# Patient Record
Sex: FEMALE | HISPANIC OR LATINO | URBAN - METROPOLITAN AREA
[De-identification: names, ages, dates, MRNs, and addresses within clinical notes are randomized per-mention and may not be internally consistent; named-entity substitution may affect disease eponyms.]

---

## 2019-01-23 ENCOUNTER — OFFICE VISIT (OUTPATIENT)
Dept: GASTROENTEROLOGY | Facility: CLINIC | Age: 59
End: 2019-01-23

## 2019-01-23 VITALS
BODY MASS INDEX: 42.04 KG/M2 | HEIGHT: 68 IN | WEIGHT: 277.4 LBS | DIASTOLIC BLOOD PRESSURE: 88 MMHG | HEART RATE: 93 BPM | SYSTOLIC BLOOD PRESSURE: 130 MMHG

## 2019-01-23 DIAGNOSIS — E66.01 CLASS 3 SEVERE OBESITY DUE TO EXCESS CALORIES WITHOUT SERIOUS COMORBIDITY WITH BODY MASS INDEX (BMI) OF 40.0 TO 44.9 IN ADULT (HCC): ICD-10-CM

## 2019-01-23 DIAGNOSIS — K75.4 HEPATITIS, AUTOIMMUNE (HCC): Primary | ICD-10-CM

## 2019-01-23 PROCEDURE — S0260 H&P FOR SURGERY: HCPCS | Performed by: INTERNAL MEDICINE

## 2019-01-23 RX ORDER — LATANOPROST 50 UG/ML
1 SOLUTION/ DROPS OPHTHALMIC NIGHTLY
COMMUNITY

## 2019-01-23 RX ORDER — AZATHIOPRINE 50 MG/1
50 TABLET ORAL DAILY
COMMUNITY
End: 2019-05-22 | Stop reason: SDUPTHER

## 2019-01-23 RX ORDER — LOSARTAN POTASSIUM AND HYDROCHLOROTHIAZIDE 25; 100 MG/1; MG/1
1 TABLET ORAL DAILY
COMMUNITY

## 2019-01-23 RX ORDER — BUDESONIDE 9 MG/1
TABLET, FILM COATED, EXTENDED RELEASE ORAL
COMMUNITY
End: 2019-03-08

## 2019-01-24 PROBLEM — K75.4 HEPATITIS, AUTOIMMUNE (HCC): Status: ACTIVE | Noted: 2019-01-24

## 2019-01-24 PROBLEM — E66.01 CLASS 3 SEVERE OBESITY DUE TO EXCESS CALORIES WITHOUT SERIOUS COMORBIDITY WITH BODY MASS INDEX (BMI) OF 40.0 TO 44.9 IN ADULT (HCC): Status: ACTIVE | Noted: 2019-01-24

## 2019-01-24 PROBLEM — E66.813 CLASS 3 SEVERE OBESITY DUE TO EXCESS CALORIES WITHOUT SERIOUS COMORBIDITY WITH BODY MASS INDEX (BMI) OF 40.0 TO 44.9 IN ADULT: Status: ACTIVE | Noted: 2019-01-24

## 2019-01-24 NOTE — PROGRESS NOTES
GASTROENTEROLOGY OFFICE NOTE  Niru Woodward  [id number]  1960    CARE TEAM  Patient Care Team:  Sathya Summers MD as PCP - General (Internal Medicine)    No ref. provider found     Chief Complaint   Patient presents with   • Follow-up     idiopathic autoimmune hepatitis         HISTORY OF PRESENT ILLNESS:  Patient well known to me with long-standing idiopathic autoimmune hepatitis currently being managed with azathioprine 50 mg per day and budesonide 9 mg per day.  She is asymptomatic without fevers, chills, jaundice, fatigue, malaise, dysphagia, odynophagia, early satiety or unexplained weight loss    Her most recent serum liver enzymes are from 12/28/18 and her AST at that time was 24 units per liter and ALT was 20 units per liter.  Alpine phosphatase was only mildly elevated at 120 units per liter.    She is otherwise doing well    PAST MEDICAL HISTORY  Past Medical History:   Diagnosis Date   • Autoimmune hepatitis (CMS/HCC)    • Hypertension         PAST SURGICAL HISTORY  Past Surgical History:   Procedure Laterality Date   • CARPAL TUNNEL RELEASE     • CHOLECYSTECTOMY     • HYSTERECTOMY          MEDICATIONS:    Current Outpatient Medications:   •  azaTHIOprine (IMURAN) 50 MG tablet, Take 50 mg by mouth Daily., Disp: , Rfl:   •  Budesonide ER 9 MG tablet sustained-release 24 hour, Take  by mouth., Disp: , Rfl:   •  latanoprost (XALATAN) 0.005 % ophthalmic solution, 1 drop Every Night., Disp: , Rfl:   •  losartan-hydrochlorothiazide (HYZAAR) 100-25 MG per tablet, Take 1 tablet by mouth Daily., Disp: , Rfl:   •  TIMOLOL HEMIHYDRATE OP, Apply  to eye(s) as directed by provider., Disp: , Rfl:     ALLERGIES  No Known Allergies    FAMILY HISTORY:  Family History   Problem Relation Age of Onset   • Hypertension Mother    • Hypertension Father    • Hypertension Sister    • Rheum arthritis Child        SOCIAL HISTORY  Social History     Socioeconomic History   • Marital status:      Spouse name: Not on  "file   • Number of children: Not on file   • Years of education: Not on file   • Highest education level: Not on file   Tobacco Use   • Smoking status: Never Smoker   • Smokeless tobacco: Never Used   Substance and Sexual Activity   • Alcohol use: No     Frequency: Never   • Drug use: No   • Sexual activity: Defer     Socioeconomic History: She is .  Which children.  She works [employer] business.  She is a nonsmoker/nondrinker        REVIEW OF SYSTEMS  Review of Systems    PHYSICAL EXAM   /88 (BP Location: Right arm, Patient Position: Sitting, Cuff Size: Adult)   Pulse 93   Ht 172.7 cm (68\")   Wt 126 kg (277 lb 6.4 oz)   BMI 42.18 kg/m²   General: Alert and oriented x 3. In no apparent or acute distress.  and No stigmata of chronic liver disease  HEENT: Anicteric slcera. Normal oropharynx  Neck: Supple. Without lymphadenopathy  CV: Regular rate and rhythm, S1, S2  Lungs: Clear to ausculation. Without rales, robchi and wheezing  Abdomen:  Soft,non-distended without palpable masses or hepatosplenomeagaly, areas of rebound tenderness or guarding.   Extremeties: without clubbing, cyanosis or edema  Neurologic:  Alert and oriented x 3 without focal motor or sensory deficits  Rectal exam: deferred     No results found for this or any previous visit.     Results Review:  I reviewed the patient's new clinical results.      ASSESSMENT   1.-Idiopathic autoimmune hepatitis with essentially normal serum liver enzymes.  2.-Obesity.  Again, weight loss strategies were reviewed with patient she expressed an understanding of the need and importance do so.  She states she was doing very well up until the recent holidays and had been losing weight but gained that back    PLAN  1.-Continue budesonide 9 mg per day  2.-Continue azathioprine 50 mg per day  3.-Weight loss  4.-Return appointment in 6 months      I discussed the patients findings and my recommendations with patient    Mikey Hyatt, " MD  1/24/2019   6:51 AM    Much of this note is an electronic transcription of spoken language to printed text. Electronic transcription of spoken language may permit erroneous, nonsensical word phrases to be inadvertently transcribed.  Although I have reviewed the note for these errors, some may still be present.

## 2019-03-08 RX ORDER — BUDESONIDE 3 MG/1
CAPSULE, COATED PELLETS ORAL
Qty: 90 CAPSULE | Refills: 3 | Status: SHIPPED | OUTPATIENT
Start: 2019-03-08 | End: 2019-07-08 | Stop reason: SDUPTHER

## 2019-05-22 DIAGNOSIS — K75.4 HEPATITIS, AUTOIMMUNE (HCC): Primary | ICD-10-CM

## 2019-05-22 DIAGNOSIS — Z51.81 MEDICATION MONITORING ENCOUNTER: ICD-10-CM

## 2019-05-22 DIAGNOSIS — Z51.81 ENCOUNTER FOR MEDICATION MONITORING: ICD-10-CM

## 2019-05-22 RX ORDER — AZATHIOPRINE 50 MG/1
TABLET ORAL
Qty: 60 TABLET | Refills: 2 | Status: SHIPPED | OUTPATIENT
Start: 2019-05-22 | End: 2020-04-13

## 2019-07-09 RX ORDER — BUDESONIDE 3 MG/1
CAPSULE, COATED PELLETS ORAL
Qty: 90 CAPSULE | Refills: 2 | Status: SHIPPED | OUTPATIENT
Start: 2019-07-09 | End: 2019-11-10 | Stop reason: SDUPTHER

## 2019-08-15 ENCOUNTER — OFFICE VISIT (OUTPATIENT)
Dept: GASTROENTEROLOGY | Facility: CLINIC | Age: 59
End: 2019-08-15

## 2019-08-15 VITALS
WEIGHT: 264.4 LBS | SYSTOLIC BLOOD PRESSURE: 133 MMHG | DIASTOLIC BLOOD PRESSURE: 92 MMHG | HEART RATE: 92 BPM | HEIGHT: 68 IN | BODY MASS INDEX: 40.07 KG/M2

## 2019-08-15 DIAGNOSIS — K75.4 HEPATITIS, AUTOIMMUNE (HCC): Primary | ICD-10-CM

## 2019-08-15 PROCEDURE — 99211 OFF/OP EST MAY X REQ PHY/QHP: CPT | Performed by: INTERNAL MEDICINE

## 2019-08-15 NOTE — PROGRESS NOTES
GASTROENTEROLOGY OFFICE NOTE  Niru Woodward  [id number]     1960    CARE TEAM  Patient Care Team:  Sathya Summers MD as PCP - General (Internal Medicine)    No ref. provider found     Chief Complaint   Patient presents with   • Follow-up     Idiopathic autoimmune hepatitis        HISTORY OF PRESENT ILLNESS:  Patient presents for follow-up for prescription refill basically she is on Imuran 50 mg/day and budesonide 9 mg/day with normal serum liver enzymes currently.  Her most recent labs are reviewed.  Her AST is 25 units/L and her ALT 18 units/L with high normal 40 and 32 units/L respectively.  Alkaline phosphatase is only mildly elevated at 127 units/L (high normal 117 units/L) total bilirubin is 0.4.    She denies dysphagia, odynophagia, early satiety, unexplained weight loss, melanotic stools, constipation or diarrhea, dark urine, jaundice, acholic stools and states that she has been making efforts to lose weight and as a result has lost some weight through dietary modifications        PAST MEDICAL HISTORY  Past Medical History:   Diagnosis Date   • Autoimmune hepatitis (CMS/HCC)    • Hypertension         PAST SURGICAL HISTORY  Past Surgical History:   Procedure Laterality Date   • CARPAL TUNNEL RELEASE     • CHOLECYSTECTOMY     • HYSTERECTOMY          MEDICATIONS:    Current Outpatient Medications:   •  azaTHIOprine (IMURAN) 50 MG tablet, Take 2 tablets by mouth daily, Disp: 60 tablet, Rfl: 2  •  Budesonide (ENTOCORT EC) 3 MG 24 hr capsule, TAKE THREE CAPSULES BY MOUTH EVERY MORNING, Disp: 90 capsule, Rfl: 2  •  latanoprost (XALATAN) 0.005 % ophthalmic solution, 1 drop Every Night., Disp: , Rfl:   •  losartan-hydrochlorothiazide (HYZAAR) 100-25 MG per tablet, Take 1 tablet by mouth Daily., Disp: , Rfl:   •  TIMOLOL HEMIHYDRATE OP, Apply  to eye(s) as directed by provider., Disp: , Rfl:     ALLERGIES  No Known Allergies    FAMILY HISTORY:  Family History   Problem Relation Age of Onset   • Hypertension  "Mother    • Hypertension Father    • Hypertension Sister    • Rheum arthritis Child    • Colon cancer Neg Hx    • Colon polyps Neg Hx        SOCIAL HISTORY  Social History     Socioeconomic History   • Marital status:      Spouse name: Not on file   • Number of children: Not on file   • Years of education: Not on file   • Highest education level: Not on file   Tobacco Use   • Smoking status: Never Smoker   • Smokeless tobacco: Never Used   Substance and Sexual Activity   • Alcohol use: No     Frequency: Never   • Drug use: No   • Sexual activity: Defer     Socioeconomic History:  She is  with children non-smoker.         REVIEW OF SYSTEMS  Review of Systems   Constitutional: Negative for activity change, appetite change, chills, diaphoresis, fatigue, fever, unexpected weight gain and unexpected weight loss.   HENT: Negative for trouble swallowing and voice change.    Gastrointestinal: Negative for abdominal distention, abdominal pain, anal bleeding, blood in stool, constipation, diarrhea, nausea, rectal pain, vomiting, GERD and indigestion.     I reviewed the above ROS.    PHYSICAL EXAM   /92 (BP Location: Right arm, Patient Position: Sitting, Cuff Size: Adult)   Pulse 92   Ht 172.7 cm (68\")   Wt 120 kg (264 lb 6.4 oz)   BMI 40.20 kg/m²   General: Alert and oriented x 3. In no apparent or acute distress.  and No stigmata of chronic liver disease     No results found for this or any previous visit.     Results Review:  I reviewed the patient's new clinical results.      ASSESSMENT  1.-  Idiopathic autoimmune hepatitis stable on current medical regimen  2.-  Nonmorbid to the patient making progress in weight loss with dietary modification    PLAN  1.-  Decrease budesonide down to 6 mg/day   2.-  Appointment in 6 months  3.-  Monitor serum liver enzymes    I discussed the patients findings and my recommendations with patient    Mikey Hyatt MD  8/15/2019   2:43 PM    Much of this " note is an electronic transcription of spoken language to printed text. Electronic transcription of spoken language may permit erroneous, nonsensical word phrases to be inadvertently transcribed.  Although I have reviewed the note for these errors, some may still be present.

## 2019-11-11 RX ORDER — BUDESONIDE 3 MG/1
CAPSULE, COATED PELLETS ORAL
Qty: 90 CAPSULE | Refills: 1 | Status: SHIPPED | OUTPATIENT
Start: 2019-11-11 | End: 2020-02-13

## 2019-11-14 ENCOUNTER — OFFICE VISIT (OUTPATIENT)
Dept: GASTROENTEROLOGY | Facility: CLINIC | Age: 59
End: 2019-11-14

## 2019-11-14 VITALS
HEART RATE: 81 BPM | WEIGHT: 261 LBS | DIASTOLIC BLOOD PRESSURE: 79 MMHG | HEIGHT: 68 IN | BODY MASS INDEX: 39.56 KG/M2 | SYSTOLIC BLOOD PRESSURE: 125 MMHG

## 2019-11-14 DIAGNOSIS — E66.01 CLASS 3 SEVERE OBESITY DUE TO EXCESS CALORIES WITHOUT SERIOUS COMORBIDITY WITH BODY MASS INDEX (BMI) OF 40.0 TO 44.9 IN ADULT (HCC): ICD-10-CM

## 2019-11-14 DIAGNOSIS — K75.4 HEPATITIS, AUTOIMMUNE (HCC): Primary | ICD-10-CM

## 2019-11-14 PROCEDURE — S0260 H&P FOR SURGERY: HCPCS | Performed by: INTERNAL MEDICINE

## 2020-02-13 RX ORDER — BUDESONIDE 3 MG/1
CAPSULE, COATED PELLETS ORAL
Qty: 81 CAPSULE | Refills: 2 | Status: SHIPPED | OUTPATIENT
Start: 2020-02-13 | End: 2020-07-27

## 2020-04-13 DIAGNOSIS — K75.4 HEPATITIS, AUTOIMMUNE (HCC): Primary | ICD-10-CM

## 2020-04-13 RX ORDER — AZATHIOPRINE 50 MG/1
TABLET ORAL
Qty: 30 TABLET | Refills: 0 | Status: SHIPPED | OUTPATIENT
Start: 2020-04-13 | End: 2020-05-06

## 2020-05-06 RX ORDER — AZATHIOPRINE 50 MG/1
TABLET ORAL
Qty: 30 TABLET | Refills: 0 | Status: SHIPPED | OUTPATIENT
Start: 2020-05-06 | End: 2020-05-26

## 2020-05-26 RX ORDER — AZATHIOPRINE 50 MG/1
TABLET ORAL
Qty: 30 TABLET | Refills: 1 | Status: SHIPPED | OUTPATIENT
Start: 2020-05-26 | End: 2020-07-14

## 2020-06-23 ENCOUNTER — OFFICE VISIT (OUTPATIENT)
Dept: GASTROENTEROLOGY | Facility: CLINIC | Age: 60
End: 2020-06-23

## 2020-06-23 VITALS
TEMPERATURE: 97.7 F | DIASTOLIC BLOOD PRESSURE: 91 MMHG | HEART RATE: 67 BPM | BODY MASS INDEX: 40.41 KG/M2 | WEIGHT: 265.8 LBS | SYSTOLIC BLOOD PRESSURE: 162 MMHG

## 2020-06-23 DIAGNOSIS — K75.4 HEPATITIS, AUTOIMMUNE (HCC): Primary | ICD-10-CM

## 2020-06-23 DIAGNOSIS — Z51.81 MEDICATION MONITORING ENCOUNTER: ICD-10-CM

## 2020-06-23 PROCEDURE — 99213 OFFICE O/P EST LOW 20 MIN: CPT | Performed by: INTERNAL MEDICINE

## 2020-06-23 NOTE — PROGRESS NOTES
GASTROENTEROLOGY OFFICE NOTE  Niru Woodward  [id number]     1960    CARE TEAM  Patient Care Team:  Sathya Summers MD as PCP - General (Internal Medicine)    No ref. provider found     Chief Complaint   Patient presents with   • Follow-up     Autoimmune Hepatitis        HISTORY OF PRESENT ILLNESS:  Patient presents for follow-up of her idiopathic autoimmune hepatitis.    She is doing well.  Most recent labs from her primary care physician revealed normal liver enzymes and normal CBC without evidence of bone marrow toxicity or elevated liver enzymes.    She is requesting colon cancer screening which we have tried to get done in the past but she is open to doing this now.    She does not have any new complaints and is without dysphagia to solids, odynophagia, early satiety unexplained weight loss melena or bright red blood per rectum.    PAST MEDICAL HISTORY  Past Medical History:   Diagnosis Date   • Autoimmune hepatitis (CMS/HCC)    • Hypertension         PAST SURGICAL HISTORY  Past Surgical History:   Procedure Laterality Date   • CARPAL TUNNEL RELEASE     • CHOLECYSTECTOMY     • COLONOSCOPY     • ENDOSCOPY     • HYSTERECTOMY          MEDICATIONS:    Current Outpatient Medications:   •  azaTHIOprine (IMURAN) 50 MG tablet, TAKE ONE TABLET BY MOUTH DAILY, Disp: 30 tablet, Rfl: 1  •  Budesonide (ENTOCORT EC) 3 MG 24 hr capsule, TAKE THREE CAPSULES BY MOUTH EVERY MORNING, Disp: 81 capsule, Rfl: 2  •  latanoprost (XALATAN) 0.005 % ophthalmic solution, 1 drop Every Night., Disp: , Rfl:   •  TIMOLOL HEMIHYDRATE OP, Apply  to eye(s) as directed by provider., Disp: , Rfl:   •  losartan-hydrochlorothiazide (HYZAAR) 100-25 MG per tablet, Take 1 tablet by mouth Daily., Disp: , Rfl:     ALLERGIES  No Known Allergies    FAMILY HISTORY:  Family History   Problem Relation Age of Onset   • Hypertension Mother    • Hypertension Father    • Hypertension Sister    • Rheum arthritis Child    • Colon cancer Neg Hx    • Colon  polyps Neg Hx        SOCIAL HISTORY  Social History     Socioeconomic History   • Marital status:      Spouse name: Not on file   • Number of children: Not on file   • Years of education: Not on file   • Highest education level: Not on file   Tobacco Use   • Smoking status: Never Smoker   • Smokeless tobacco: Never Used   Substance and Sexual Activity   • Alcohol use: No     Frequency: Never   • Drug use: No   • Sexual activity: Defer     Socioeconomic History:  She is originally from [place].   with children and grandchildren she is a non-smoker and nondrinker.  Currently not working as her usual job at a [employer] service is down staff due to the coronavirus pandemic       REVIEW OF SYSTEMS  Review of Systems   Constitutional: Negative for activity change, appetite change, chills, diaphoresis, fatigue, fever, unexpected weight gain and unexpected weight loss.   HENT: Negative for trouble swallowing and voice change.    Gastrointestinal: Positive for constipation. Negative for abdominal distention, abdominal pain, anal bleeding, blood in stool, diarrhea, nausea, rectal pain, vomiting, GERD and indigestion.     I reviewed the above-noted review of systems    PHYSICAL EXAM   /91 (BP Location: Left arm, Patient Position: Sitting, Cuff Size: Adult)   Pulse 67   Temp 97.7 °F (36.5 °C) (Temporal)   Wt 121 kg (265 lb 12.8 oz)   BMI 40.41 kg/m²   General: Alert and oriented x 3. In no apparent or acute distress.  and No stigmata of chronic liver disease  HEENT: Anicteric slcera. Normal oropharynx  Neck: Supple. Without lymphadenopathy  CV: Regular rate and rhythm, S1, S2  Lungs: Clear to ausculation. Without rales, rhonchi and wheezing  Abdomen:  Soft,non-distended without palpable masses or hepatosplenomeagaly, areas of rebound tenderness or guarding.   Extremeties: without clubbing, cyanosis or edema  Neurologic:  Alert and oriented x 3 without focal motor or sensory deficits  Rectal exam: deferred        Results Review:  I reviewed her labs from April 21, 2020.  WBC equals 6.3, hemoglobin equal 13.5, hematocrit equal 39.5, platelet count 291,000, MCV 86      Alkaline phosphatase 135 units/L (high normal 117), AST normal 23, ALT normal at 19 units/L.  Total bilirubin normal at 0.5.    ASSESSMENT  1.-  Idiopathic autoimmune  hepatitis on budesonide 6 mg/day azathioprine 50 mg daily with what would appear to be good control of her disease without active disease apparent.  Continue current regimen  2.-  Suspected early cirrhosis.  EGD for exclusion of varices recommended  3.-  Average risk for colon cancer.  Patient requesting colon cancer screening this will be scheduled at her convenience    PLAN  1.-  Schedule colonoscopy for screening purposes  2.-  Schedule EGD for exclusion of varices  3.-  Continue budesonide 6 mg/day/Imuran 50 mg/day        For rooming purposes  was used for part of the office visit       ID:[id number]   Name:Lisa     I discussed the patient's findings and my recommendations with patient    Mikey Todd Hyatt MD  6/23/2020   15:41    Much of this note is an electronic transcription of spoken language to printed text. Electronic transcription of spoken language may permit erroneous, nonsensical word phrases to be inadvertently transcribed.  Although I have reviewed the note for these errors, some may still be present.

## 2020-07-02 RX ORDER — SODIUM, POTASSIUM,MAG SULFATES 17.5-3.13G
2 SOLUTION, RECONSTITUTED, ORAL ORAL TAKE AS DIRECTED
Qty: 354 ML | Refills: 0 | Status: SHIPPED | OUTPATIENT
Start: 2020-07-02 | End: 2020-10-15

## 2020-07-12 ENCOUNTER — APPOINTMENT (OUTPATIENT)
Dept: PREADMISSION TESTING | Facility: HOSPITAL | Age: 60
End: 2020-07-12

## 2020-07-12 PROCEDURE — C9803 HOPD COVID-19 SPEC COLLECT: HCPCS

## 2020-07-12 PROCEDURE — U0002 COVID-19 LAB TEST NON-CDC: HCPCS

## 2020-07-12 PROCEDURE — U0004 COV-19 TEST NON-CDC HGH THRU: HCPCS

## 2020-07-13 LAB
REF LAB TEST METHOD: NORMAL
SARS-COV-2 RNA RESP QL NAA+PROBE: NOT DETECTED

## 2020-07-14 RX ORDER — AZATHIOPRINE 50 MG/1
TABLET ORAL
Qty: 30 TABLET | Refills: 0 | Status: SHIPPED | OUTPATIENT
Start: 2020-07-14 | End: 2020-09-09

## 2020-07-15 ENCOUNTER — OUTSIDE FACILITY SERVICE (OUTPATIENT)
Dept: GASTROENTEROLOGY | Facility: CLINIC | Age: 60
End: 2020-07-15

## 2020-07-15 PROCEDURE — 45385 COLONOSCOPY W/LESION REMOVAL: CPT | Performed by: INTERNAL MEDICINE

## 2020-07-15 PROCEDURE — 43235 EGD DIAGNOSTIC BRUSH WASH: CPT | Performed by: INTERNAL MEDICINE

## 2020-07-27 RX ORDER — BUDESONIDE 3 MG/1
CAPSULE, COATED PELLETS ORAL
Qty: 81 CAPSULE | Refills: 0 | Status: SHIPPED | OUTPATIENT
Start: 2020-07-27 | End: 2020-08-28

## 2020-08-28 RX ORDER — BUDESONIDE 3 MG/1
CAPSULE, COATED PELLETS ORAL
Qty: 81 CAPSULE | Refills: 1 | Status: SHIPPED | OUTPATIENT
Start: 2020-08-28 | End: 2020-11-02

## 2020-09-09 RX ORDER — AZATHIOPRINE 50 MG/1
TABLET ORAL
Qty: 30 TABLET | Refills: 0 | Status: SHIPPED | OUTPATIENT
Start: 2020-09-09 | End: 2020-10-09

## 2020-10-09 RX ORDER — AZATHIOPRINE 50 MG/1
TABLET ORAL
Qty: 30 TABLET | Refills: 11 | Status: SHIPPED | OUTPATIENT
Start: 2020-10-09 | End: 2021-09-30

## 2020-10-15 ENCOUNTER — OFFICE VISIT (OUTPATIENT)
Dept: GASTROENTEROLOGY | Facility: CLINIC | Age: 60
End: 2020-10-15

## 2020-10-15 VITALS
HEART RATE: 75 BPM | WEIGHT: 264 LBS | SYSTOLIC BLOOD PRESSURE: 180 MMHG | DIASTOLIC BLOOD PRESSURE: 90 MMHG | BODY MASS INDEX: 40.14 KG/M2 | TEMPERATURE: 97.5 F

## 2020-10-15 DIAGNOSIS — K75.4 HEPATITIS, AUTOIMMUNE (HCC): Primary | ICD-10-CM

## 2020-10-15 DIAGNOSIS — E66.01 CLASS 3 SEVERE OBESITY DUE TO EXCESS CALORIES WITHOUT SERIOUS COMORBIDITY WITH BODY MASS INDEX (BMI) OF 40.0 TO 44.9 IN ADULT (HCC): ICD-10-CM

## 2020-10-15 DIAGNOSIS — Z51.81 MEDICATION MONITORING ENCOUNTER: ICD-10-CM

## 2020-10-15 DIAGNOSIS — Z51.81 ENCOUNTER FOR MEDICATION MONITORING: ICD-10-CM

## 2020-10-15 PROCEDURE — 99213 OFFICE O/P EST LOW 20 MIN: CPT | Performed by: INTERNAL MEDICINE

## 2020-10-15 NOTE — PROGRESS NOTES
GASTROENTEROLOGY OFFICE NOTE  Niru Woodward  [id number]  1960    CARE TEAM  Patient Care Team:  Sathya Summers MD as PCP - General (Internal Medicine)    No ref. provider found     Chief Complaint   Patient presents with   • Follow-up     follow up autoimmune hepatitis        HISTORY OF PRESENT ILLNESS:  Patient presents for follow-up of idiopathic autoimmune hepatitis on Imuran 50 mg a day budesonide 6 mg daily with normal liver enzymes on labs of October 6, 2020.    She is doing well she is lost about 6 pounds since I last saw her and she states that she is try to avoid carbohydrates although she still admits to eating rice on a regular basis.    From GI standpoint she is without symptoms denying dysphagia, odynophagia, early satiety, unexplained weight loss, melena or bright red blood per rectum.    PAST MEDICAL HISTORY  Past Medical History:   Diagnosis Date   • Autoimmune hepatitis (CMS/HCC)    • Hypertension         PAST SURGICAL HISTORY  Past Surgical History:   Procedure Laterality Date   • CARPAL TUNNEL RELEASE     • CHOLECYSTECTOMY     • COLONOSCOPY     • ENDOSCOPY     • HYSTERECTOMY          MEDICATIONS:    Current Outpatient Medications:   •  azaTHIOprine (IMURAN) 50 MG tablet, TAKE ONE TABLET BY MOUTH DAILY, Disp: 30 tablet, Rfl: 11  •  Budesonide (ENTOCORT EC) 3 MG 24 hr capsule, TAKE THREE CAPSULES BY MOUTH EVERY MORNING, Disp: 81 capsule, Rfl: 1  •  latanoprost (XALATAN) 0.005 % ophthalmic solution, 1 drop Every Night., Disp: , Rfl:   •  losartan-hydrochlorothiazide (HYZAAR) 100-25 MG per tablet, Take 1 tablet by mouth Daily., Disp: , Rfl:   •  TIMOLOL HEMIHYDRATE OP, Apply  to eye(s) as directed by provider., Disp: , Rfl:     ALLERGIES  No Known Allergies    FAMILY HISTORY:  Family History   Problem Relation Age of Onset   • Hypertension Mother    • Hypertension Father    • Hypertension Sister    • Rheum arthritis Child    • Colon cancer Neg Hx    • Colon polyps Neg Hx        SOCIAL  HISTORY  Social History     Socioeconomic History   • Marital status:      Spouse name: Not on file   • Number of children: Not on file   • Years of education: Not on file   • Highest education level: Not on file   Tobacco Use   • Smoking status: Never Smoker   • Smokeless tobacco: Never Used   Substance and Sexual Activity   • Alcohol use: No     Frequency: Never   • Drug use: No   • Sexual activity: Defer     Socioeconomic History:  .  Originally from [place].  She has many children and grandchildren.  She is a non-smoker/nondrinker.       REVIEW OF SYSTEMS  Review of Systems   Constitutional: Negative.    HENT: Negative for trouble swallowing and voice change.    Gastrointestinal: Negative.      I reviewed the above-noted review of systems.    PHYSICAL EXAM   /90   Pulse 75   Temp 97.5 °F (36.4 °C)   Wt 120 kg (264 lb)   BMI 40.14 kg/m²   General: Alert and oriented x 3. In no apparent or acute distress.  and No stigmata of chronic liver disease  HEENT: Wearing facial mask.  Wearing glasses.  Anicteric sclera  Neck: Supple. Without lymphadenopathy  CV: Regular rate and rhythm, S1, S2  Lungs: Clear to ausculation. Without rales, rhonchi and wheezing  Abdomen:  Soft,non-distended without palpable masses or hepatosplenomeagaly, areas of rebound tenderness or guarding.   Extremeties: without clubbing, cyanosis or edema  Neurologic:  Alert and oriented x 3 without focal motor or sensory deficits  Rectal exam: deferred        Results Review:  I reviewed the patient's new clinical results.  Labs from 10/6/2020 show a white count of 6.7/hemoglobin 13.7/hematocrit 40.4/platelet count 270,000  CMP revealed AST of 30 units/L, ALT 24 units/L, alkaline phosphatase slightly elevated 136 units/L (high normal 117) and total bilirubin of 0.6.      ASSESSMENT  1.-Idiopathic autoimmune hepatitis on azathioprine/budesonide therapy and serological remission  2.-Obesity.  Weight loss encouraged  3.-Suspected  early cirrhosis.  No varices on EGD earlier this year  4.-Patient up-to-date on colon cancer screening last colonoscopy on 7/15/2020.  Adenomatous polyps removed at that time.  Surveillance recommended in 3 years    PLAN  1.-Continue azathioprine 50 mg/day/budesonide 6 mg/day  2.-Patient is encouraged to continue weight loss and avoid carbohydrates and practically rice which seems to be the biggest culprit here  3.-Screening/surveillance colonoscopy in 2023  4.-Follow-up in 6 months    Mikey Hyatt MD  10/15/2020   13:56 EDT

## 2020-11-02 RX ORDER — BUDESONIDE 3 MG/1
CAPSULE, COATED PELLETS ORAL
Qty: 81 CAPSULE | Refills: 0 | Status: SHIPPED | OUTPATIENT
Start: 2020-11-02 | End: 2020-11-30

## 2020-11-30 RX ORDER — BUDESONIDE 3 MG/1
CAPSULE, COATED PELLETS ORAL
Qty: 81 CAPSULE | Refills: 3 | Status: SHIPPED | OUTPATIENT
Start: 2020-11-30 | End: 2021-06-02 | Stop reason: SDUPTHER

## 2021-04-24 ENCOUNTER — DOCUMENTATION (OUTPATIENT)
Dept: GASTROENTEROLOGY | Facility: CLINIC | Age: 61
End: 2021-04-24

## 2021-04-24 NOTE — PROGRESS NOTES
Labs reviewed from primary care's office  CMP shows bilirubin total of 0.7 mg/dL.  Alkaline phosphatase only mildly elevated 139 units/L.  Most importantly AST and ALT are within normal limits at 38 units/L and 32 units/L.

## 2021-05-09 ENCOUNTER — DOCUMENTATION (OUTPATIENT)
Dept: GASTROENTEROLOGY | Facility: CLINIC | Age: 61
End: 2021-05-09

## 2021-05-09 NOTE — PROGRESS NOTES
AST and ALT of 32 and 38 units/L noted.  Both within normal limits.  Mild elevation of alkaline phosphatase at 139 units/L (high normal 136.  Globulin is normal 3.8 g/dL Albumin normal 3.6 g/dL total bilirubin normal at 0.7 mg/dL.  Creatinine normal at 0.5

## 2021-05-27 ENCOUNTER — OFFICE VISIT (OUTPATIENT)
Dept: GASTROENTEROLOGY | Facility: CLINIC | Age: 61
End: 2021-05-27

## 2021-05-27 VITALS
TEMPERATURE: 97.9 F | DIASTOLIC BLOOD PRESSURE: 88 MMHG | WEIGHT: 265.6 LBS | BODY MASS INDEX: 40.38 KG/M2 | SYSTOLIC BLOOD PRESSURE: 181 MMHG | HEART RATE: 83 BPM

## 2021-05-27 DIAGNOSIS — K75.4 HEPATITIS, AUTOIMMUNE (HCC): Primary | ICD-10-CM

## 2021-05-27 PROCEDURE — 99213 OFFICE O/P EST LOW 20 MIN: CPT | Performed by: NURSE PRACTITIONER

## 2021-05-27 RX ORDER — BRIMONIDINE TARTRATE/TIMOLOL 0.2%-0.5%
1 DROPS OPHTHALMIC (EYE) AS NEEDED
COMMUNITY
Start: 2021-05-16

## 2021-06-02 RX ORDER — BUDESONIDE 3 MG/1
9 CAPSULE, COATED PELLETS ORAL EVERY MORNING
Qty: 90 CAPSULE | Refills: 2 | Status: SHIPPED | OUTPATIENT
Start: 2021-06-02 | End: 2021-06-02

## 2021-06-23 RX ORDER — BUDESONIDE 3 MG/1
CAPSULE, COATED PELLETS ORAL
Qty: 90 CAPSULE | Refills: 1 | Status: SHIPPED | OUTPATIENT
Start: 2021-06-23 | End: 2022-10-31

## 2021-09-30 RX ORDER — AZATHIOPRINE 50 MG/1
TABLET ORAL
Qty: 30 TABLET | Refills: 11 | Status: SHIPPED | OUTPATIENT
Start: 2021-09-30 | End: 2023-01-12 | Stop reason: SDUPTHER

## 2021-09-30 NOTE — TELEPHONE ENCOUNTER
Rx Refill Note  Requested Prescriptions     Pending Prescriptions Disp Refills   • azaTHIOprine (IMURAN) 50 MG tablet [Pharmacy Med Name: azaTHIOprine 50 MG TABLET] 30 tablet 11     Sig: TAKE ONE TABLET BY MOUTH DAILY      Last office visit with prescribing clinician: 10/15/2020      Next office visit with prescribing clinician: 11/16/2021            Kiran Abreu MA  09/30/21, 12:25 EDT

## 2022-01-26 ENCOUNTER — OFFICE VISIT (OUTPATIENT)
Dept: GASTROENTEROLOGY | Facility: CLINIC | Age: 62
End: 2022-01-26

## 2022-01-26 VITALS
HEART RATE: 81 BPM | BODY MASS INDEX: 37.77 KG/M2 | TEMPERATURE: 97.5 F | SYSTOLIC BLOOD PRESSURE: 158 MMHG | DIASTOLIC BLOOD PRESSURE: 95 MMHG | WEIGHT: 248.4 LBS

## 2022-01-26 DIAGNOSIS — K75.4 HEPATITIS, AUTOIMMUNE: Primary | ICD-10-CM

## 2022-01-26 RX ORDER — DORZOLAMIDE HYDROCHLORIDE AND TIMOLOL MALEATE 20; 5 MG/ML; MG/ML
1 SOLUTION/ DROPS OPHTHALMIC
COMMUNITY
Start: 2021-07-09 | End: 2022-07-09

## 2022-01-26 RX ORDER — BRIMONIDINE TARTRATE 2 MG/ML
1 SOLUTION/ DROPS OPHTHALMIC 3 TIMES DAILY
COMMUNITY
Start: 2021-08-05 | End: 2022-08-05

## 2022-01-26 NOTE — PROGRESS NOTES
GASTROENTEROLOGY OFFICE NOTE  Niru Woodward  [id number]  1960      Chief Complaint   Patient presents with   • Follow-up   • Autoimmune Hepatitis        HISTORY OF PRESENT ILLNESS:  Patient with well-established with me regarding idiopathic autoimmune hepatitis currently on budesonide 9 mg a day and azathioprine 50 mg/day.  I have labs from her primary care provider, Dr. Arnett from November and August of this past year indicating that her serum liver enzymes have been consistently within normal limits.  Specifically AST and ALT were 32 and 27 units/L and August and 35 and 29 units/L in November.  She just had labs drawn in January but does not have those results immediately available for me.    She is doing well.  She is losing some weight.  She is eating healthy.  She has just recently completed in 2020 EGD and colonoscopy and is due for surveillance colonoscopy in 2023.    She denies dysphagia to solids odynophagia, early satiety, unexplained weight loss, melena or bright red blood per rectum.    She states that everybody in her family got Covid.  She tested positive but did not have any symptoms.  This was back in December      PAST MEDICAL HISTORY  Past Medical History:   Diagnosis Date   • Autoimmune hepatitis (HCC)    • History of COVID-19 01/05/2022   • Hypertension         PAST SURGICAL HISTORY  Past Surgical History:   Procedure Laterality Date   • CARPAL TUNNEL RELEASE     • CHOLECYSTECTOMY     • COLONOSCOPY     • ENDOSCOPY     • HYSTERECTOMY          MEDICATIONS:    Current Outpatient Medications:   •  azaTHIOprine (IMURAN) 50 MG tablet, TAKE ONE TABLET BY MOUTH DAILY, Disp: 30 tablet, Rfl: 11  •  brimonidine (ALPHAGAN) 0.2 % ophthalmic solution, Apply 1 drop to eye(s) as directed by provider 3 (Three) Times a Day., Disp: , Rfl:   •  Budesonide (ENTOCORT EC) 3 MG 24 hr capsule, TAKE THREE CAPSULES BY MOUTH EVERY MORNING, Disp: 90 capsule, Rfl: 1  •  Combigan 0.2-0.5 % ophthalmic solution,  Administer 1 drop to both eyes As Needed., Disp: , Rfl:   •  dorzolamide-timolol (COSOPT) 22.3-6.8 MG/ML ophthalmic solution, Apply 1 drop to eye(s) as directed by provider., Disp: , Rfl:   •  latanoprost (XALATAN) 0.005 % ophthalmic solution, 1 drop Every Night., Disp: , Rfl:   •  losartan-hydrochlorothiazide (HYZAAR) 100-25 MG per tablet, Take 1 tablet by mouth Daily., Disp: , Rfl:   •  TIMOLOL HEMIHYDRATE OP, Apply  to eye(s) as directed by provider., Disp: , Rfl:     ALLERGIES  No Known Allergies    FAMILY HISTORY:  Family History   Problem Relation Age of Onset   • Hypertension Mother    • Hypertension Father    • Hypertension Sister    • Rheum arthritis Child    • Colon cancer Neg Hx    • Colon polyps Neg Hx        SOCIAL HISTORY  Social History     Socioeconomic History   • Marital status:    Tobacco Use   • Smoking status: Never Smoker   • Smokeless tobacco: Never Used   Vaping Use   • Vaping Use: Never used   Substance and Sexual Activity   • Alcohol use: No   • Drug use: No   • Sexual activity: Defer     Socioeconomic History:  She is  with children and is a non-smoker/nondrinker.  Currently not working outside the home.         PHYSICAL EXAM   There were no vitals taken for this visit.  General: Pleasant female no apparent or acute distress  HEENT: Anicteric sclera wearing facemask  Neck: Without observed rigidity  Lungs: Grossly normal respiration without labored breathing  Neurologic: Normal cognition and affect       ASSESSMENT  1.-Recent Covid positivity  2.-Idiopathic autoimmune hepatitis and serological remission  3.-Obesity.  Patient continues to make strides in weight loss      PLAN  1.-Continue current budesonide/Imuran dose unchanged.  2.-Follow-up appointment in 1 year      Mikey Hyatt MD  1/26/2022   13:26 EST

## 2022-10-31 RX ORDER — BUDESONIDE 3 MG/1
CAPSULE, COATED PELLETS ORAL
Qty: 90 CAPSULE | Refills: 1 | Status: SHIPPED | OUTPATIENT
Start: 2022-10-31 | End: 2023-01-16 | Stop reason: SDUPTHER

## 2023-01-12 DIAGNOSIS — K75.4 HEPATITIS, AUTOIMMUNE: Primary | ICD-10-CM

## 2023-01-12 RX ORDER — AZATHIOPRINE 50 MG/1
50 TABLET ORAL DAILY
Qty: 30 TABLET | Refills: 0 | Status: SHIPPED | OUTPATIENT
Start: 2023-01-12 | End: 2023-01-16 | Stop reason: SDUPTHER

## 2023-01-16 ENCOUNTER — OFFICE VISIT (OUTPATIENT)
Dept: GASTROENTEROLOGY | Facility: CLINIC | Age: 63
End: 2023-01-16

## 2023-01-16 VITALS
SYSTOLIC BLOOD PRESSURE: 144 MMHG | HEART RATE: 74 BPM | BODY MASS INDEX: 38.98 KG/M2 | WEIGHT: 257.2 LBS | HEIGHT: 68 IN | TEMPERATURE: 97.1 F | DIASTOLIC BLOOD PRESSURE: 95 MMHG

## 2023-01-16 DIAGNOSIS — K75.4 HEPATITIS, AUTOIMMUNE: Primary | ICD-10-CM

## 2023-01-16 PROCEDURE — 99214 OFFICE O/P EST MOD 30 MIN: CPT | Performed by: NURSE PRACTITIONER

## 2023-01-16 RX ORDER — ONDANSETRON 4 MG/1
TABLET, FILM COATED ORAL EVERY 24 HOURS
COMMUNITY
Start: 2022-12-21

## 2023-01-16 RX ORDER — AZATHIOPRINE 50 MG/1
50 TABLET ORAL DAILY
Qty: 30 TABLET | Refills: 11 | Status: SHIPPED | OUTPATIENT
Start: 2023-01-16

## 2023-01-16 RX ORDER — BUDESONIDE 3 MG/1
9 CAPSULE, COATED PELLETS ORAL EVERY MORNING
Qty: 90 CAPSULE | Refills: 11 | Status: SHIPPED | OUTPATIENT
Start: 2023-01-16

## 2023-01-16 NOTE — PROGRESS NOTES
GASTROENTEROLOGY OFFICE NOTE  Niru Woodward  [id number]  1960    CARE TEAM  Patient Care Team:  Sathya Summers MD as PCP - General (Internal Medicine)    Referring Provider: Sathya Summers MD    Chief Complaint   Patient presents with   • Hepatitis        HISTORY OF PRESENT ILLNESS:  Patient has a well-established patient of Dr. Hyatt seen for idiopathic autoimmune hepatitis currently on budesonide 9 mg a day and azathioprine 50 mg/day.  Labs from her primary care provider Dr. Clifford from November 2022 showing her liver enzymes slightly elevated; AST 44/ALT 41/.  Her liver enzymes have consistently been within normal limits for several years but in January 2022 began to rise slightly.    She is doing well without GI complaints today.  She is eating healthy.  She denies dysphagia to solids, odynophagia, early satiety, unexplained weight loss, melena or bright red blood per rectum.  Her last EGD and colonoscopy were in 2020.  She will be due for surveillance colonoscopy in July 2023.    PAST MEDICAL HISTORY  Past Medical History:   Diagnosis Date   • Autoimmune hepatitis (HCC)    • History of COVID-19 01/05/2022   • Hypertension         PAST SURGICAL HISTORY  Past Surgical History:   Procedure Laterality Date   • CARPAL TUNNEL RELEASE     • CHOLECYSTECTOMY     • COLONOSCOPY     • ENDOSCOPY     • HYSTERECTOMY          MEDICATIONS:    Current Outpatient Medications:   •  azaTHIOprine (IMURAN) 50 MG tablet, Take 1 tablet by mouth Daily., Disp: 30 tablet, Rfl: 11  •  Budesonide (ENTOCORT EC) 3 MG 24 hr capsule, Take 3 capsules by mouth Every Morning., Disp: 90 capsule, Rfl: 11  •  Combigan 0.2-0.5 % ophthalmic solution, Administer 1 drop to both eyes As Needed., Disp: , Rfl:   •  latanoprost (XALATAN) 0.005 % ophthalmic solution, 1 drop Every Night., Disp: , Rfl:   •  losartan-hydrochlorothiazide (HYZAAR) 100-25 MG per tablet, Take 1 tablet by mouth Daily., Disp: , Rfl:   •  ondansetron  "(ZOFRAN) 4 MG tablet, Daily., Disp: , Rfl:   •  TIMOLOL HEMIHYDRATE OP, Apply  to eye(s) as directed by provider., Disp: , Rfl:     ALLERGIES  No Known Allergies    FAMILY HISTORY:  Family History   Problem Relation Age of Onset   • Hypertension Mother    • Hypertension Father    • Hypertension Sister    • Rheum arthritis Child    • Colon cancer Neg Hx    • Colon polyps Neg Hx        SOCIAL HISTORY  Social History     Socioeconomic History   • Marital status:    Tobacco Use   • Smoking status: Never   • Smokeless tobacco: Never   Vaping Use   • Vaping Use: Never used   Substance and Sexual Activity   • Alcohol use: No   • Drug use: No   • Sexual activity: Defer         PHYSICAL EXAM   /95 (BP Location: Left arm, Patient Position: Sitting, Cuff Size: Adult)   Pulse 74   Temp 97.1 °F (36.2 °C) (Temporal)   Ht 172.7 cm (68\")   Wt 117 kg (257 lb 3.2 oz)   BMI 39.11 kg/m²   Physical Exam  Vitals and nursing note reviewed.   Constitutional:       Appearance: Normal appearance. She is well-developed.   HENT:      Head: Normocephalic and atraumatic.      Nose: Nose normal.      Mouth/Throat:      Mouth: Mucous membranes are moist.      Pharynx: Oropharynx is clear.   Eyes:      Pupils: Pupils are equal, round, and reactive to light.   Cardiovascular:      Rate and Rhythm: Normal rate and regular rhythm.   Pulmonary:      Effort: Pulmonary effort is normal.      Breath sounds: Normal breath sounds. No wheezing or rales.   Abdominal:      General: Bowel sounds are normal.      Palpations: Abdomen is soft. There is no mass.      Tenderness: There is no abdominal tenderness. There is no guarding or rebound.      Hernia: No hernia is present.   Musculoskeletal:         General: Normal range of motion.      Cervical back: Normal range of motion and neck supple.   Skin:     General: Skin is warm and dry.   Neurological:      Mental Status: She is alert and oriented to person, place, and time.      Cranial Nerves: " No cranial nerve deficit.   Psychiatric:         Behavior: Behavior normal.         Judgment: Judgment normal.         Results Review:  Lab trend  July 2019-AST 25/ALT 18/  October 2019-AST 21/ALT 15/  April 2020-AST 23/ALT 19/  April 2021- AST 32/ALT 38/  January 2022-AST 58/ALT 54/  March 2022-AST 46/ALT 47/  August 2022-AST 41/ALT 39/  November 2022-AST 44/ALT 41/    ASSESSMENT / PLAN  Diagnoses and all orders for this visit:    1. Hepatitis, autoimmune (HCC) (Primary)  -     azaTHIOprine (IMURAN) 50 MG tablet; Take 1 tablet by mouth Daily.  Dispense: 30 tablet; Refill: 11  -     Budesonide (ENTOCORT EC) 3 MG 24 hr capsule; Take 3 capsules by mouth Every Morning.  Dispense: 90 capsule; Refill: 11       Return in about 1 year (around 1/16/2024).    I discussed the patients findings and my recommendations with patient    YASEMIN Justice

## 2024-01-16 ENCOUNTER — OFFICE VISIT (OUTPATIENT)
Dept: GASTROENTEROLOGY | Facility: CLINIC | Age: 64
End: 2024-01-16
Payer: MEDICAID

## 2024-01-16 VITALS — WEIGHT: 252 LBS | TEMPERATURE: 97.5 F | BODY MASS INDEX: 38.32 KG/M2 | OXYGEN SATURATION: 97 % | HEART RATE: 81 BPM

## 2024-01-16 DIAGNOSIS — K75.4 HEPATITIS, AUTOIMMUNE: Primary | ICD-10-CM

## 2024-01-16 NOTE — PROGRESS NOTES
GASTROENTEROLOGY OFFICE NOTE  Niru Woodward  [id number]  1960      Chief Complaint   Patient presents with    Autoimmune Hepatitis        HISTORY OF PRESENT ILLNESS:  Patient well-known to me with well-established idiopathic autoimmune hepatitis which has been in serological remission for many years now on combination therapy azathioprine/Imuran 50 mg/day and budesonide 9 mg daily.    Review of her most recent liver enzymes show that her AST and ALT have been slowly creeping up but not in the greater than 1-1/2 times upper limits of normal range.  Her most recent liver enzymes from October 23, 2023 reveal ALT of 42 units/L with a high normal of 32 and AST of 42 units/L high normal at 40.  Alkaline phosphatase is mildly elevated at 145 units/L but that is down from her previous results of June 26 and a total bilirubin of 0.6 remains within normal limits.  Total proteins are within normal limits.    She has a history of adenomatous colonic polyps and was due for surveillance in July 2023.  She is currently without insurance and pays for things out-of-pocket and wants to look into cost before committing to her surveillance colonoscopy.  Of note she had multiple large polyps specifically transverse colon polyps measuring 6 to 12 mm on her last colonoscopy which also revealed diverticulosis.      PAST MEDICAL HISTORY  Past Medical History:    Autoimmune hepatitis    History of COVID-19    Hypertension        PAST SURGICAL HISTORY  Past Surgical History:    CARPAL TUNNEL RELEASE    CHOLECYSTECTOMY    COLONOSCOPY    ENDOSCOPY    HYSTERECTOMY        MEDICATIONS:    Current Outpatient Medications:     azaTHIOprine (IMURAN) 50 MG tablet, Take 1 tablet by mouth Daily., Disp: 30 tablet, Rfl: 11    Budesonide (ENTOCORT EC) 3 MG 24 hr capsule, Take 3 capsules by mouth Every Morning., Disp: 90 capsule, Rfl: 11    Combigan 0.2-0.5 % ophthalmic solution, Administer 1 drop to both eyes As Needed., Disp: , Rfl:     latanoprost  (XALATAN) 0.005 % ophthalmic solution, 1 drop Every Night., Disp: , Rfl:     losartan-hydrochlorothiazide (HYZAAR) 100-25 MG per tablet, Take 1 tablet by mouth Daily., Disp: , Rfl:     ondansetron (ZOFRAN) 4 MG tablet, Daily., Disp: , Rfl:     TIMOLOL HEMIHYDRATE OP, Apply  to eye(s) as directed by provider., Disp: , Rfl:     ALLERGIES  has No Known Allergies.    FAMILY HISTORY:  Cancer-related family history is negative for Colon cancer.  Colon Cancer-related family history is negative for Colon cancer and Colon polyps.    SOCIAL HISTORY  She  reports that she has never smoked. She has never used smokeless tobacco. She reports that she does not drink alcohol and does not use drugs.   She is .  She has 5 children, 14 grandchildren and works for [employer].  Non-smoker nondrinker    PHYSICAL EXAM   Pulse 81   Temp 97.5 °F (36.4 °C) (Infrared)   Wt 114 kg (252 lb)   SpO2 97%   BMI 38.32 kg/m²   General: Alert and oriented x 3. In no apparent or acute distress.  and No stigmata of chronic liver disease  HEENT: Anicteric sclerae. Normal oropharynx  Neck: Supple. Without lymphadenopathy  CV: Regular rate and rhythm, S1, S2  Lungs: Clear to ausculation. Without rales, rhonchi and wheezing  Abdomen:  Soft,non-distended without palpable masses or hepatosplenomeagaly, areas of rebound tenderness or guarding.   Extremeties: without clubbing, cyanosis or edema  Neurologic:  Alert and oriented x 3 without focal motor or sensory deficits  Rectal exam: deferred       ASSESSMENT  1.-Idiopathic autoimmune hepatitis and serological remission  2.-History of adenomatous colonic polyps due for surveillance  3.-Obesity.  She states she has been losing weight recently  4.-Financial obstacles to medical care    PLAN  1.-We will get exact cost for her to do colonoscopy  2.-Continue Imuran 50 mg/budesonide 9 mg a day and change and continue to monitor serum liver enzymes 3-4 times per year along with CMP to make sure renal  function remains within normal limits  3.-Weight loss encouraged  4.-Follow-up once yearly      Mikey Hyatt MD  1/16/2024   14:13 EST

## 2024-01-20 DIAGNOSIS — K75.4 HEPATITIS, AUTOIMMUNE: ICD-10-CM

## 2024-01-22 RX ORDER — BUDESONIDE 3 MG/1
9 CAPSULE, COATED PELLETS ORAL EVERY MORNING
Qty: 90 CAPSULE | Refills: 11 | Status: SHIPPED | OUTPATIENT
Start: 2024-01-22

## 2024-01-22 NOTE — TELEPHONE ENCOUNTER
Rx Refill Note  Requested Prescriptions     Pending Prescriptions Disp Refills    Budesonide (ENTOCORT EC) 3 MG 24 hr capsule [Pharmacy Med Name: BUDESONIDE DR 3 MG CAPSULE] 90 capsule 11     Sig: TAKE THREE CAPSULES BY MOUTH EVERY MORNING      Last office visit with prescribing clinician: 1/16/2023   Last telemedicine visit with prescribing clinician: Visit date not found   Next office visit with prescribing clinician: Visit date not found                         Would you like a call back once the refill request has been completed: [] Yes [] No    If the office needs to give you a call back, can they leave a voicemail: [] Yes [] No    Stella Maldonado LPN  01/22/24, 08:16 EST

## 2024-03-01 ENCOUNTER — OFFICE VISIT (OUTPATIENT)
Dept: GASTROENTEROLOGY | Facility: CLINIC | Age: 64
End: 2024-03-01

## 2024-03-01 VITALS
WEIGHT: 239.4 LBS | DIASTOLIC BLOOD PRESSURE: 94 MMHG | HEIGHT: 68 IN | HEART RATE: 76 BPM | SYSTOLIC BLOOD PRESSURE: 148 MMHG | TEMPERATURE: 97.1 F | BODY MASS INDEX: 36.28 KG/M2

## 2024-03-01 DIAGNOSIS — K21.9 GASTROESOPHAGEAL REFLUX DISEASE, UNSPECIFIED WHETHER ESOPHAGITIS PRESENT: Primary | ICD-10-CM

## 2024-03-01 RX ORDER — OMEPRAZOLE 40 MG/1
40 CAPSULE, DELAYED RELEASE ORAL DAILY
Qty: 90 CAPSULE | Refills: 3 | Status: SHIPPED | OUTPATIENT
Start: 2024-03-01

## 2024-03-01 RX ORDER — FAMOTIDINE 20 MG/1
20 TABLET, FILM COATED ORAL
COMMUNITY
Start: 2024-02-20 | End: 2024-03-01

## 2024-03-01 RX ORDER — SUCRALFATE 1 G/1
1 TABLET ORAL 4 TIMES DAILY
COMMUNITY
Start: 2024-02-20 | End: 2024-03-21

## 2024-03-01 NOTE — PROGRESS NOTES
GASTROENTEROLOGY OFFICE NOTE  Niru Woodward  [id number]  1960    CARE TEAM  Patient Care Team:  Sathya Summers MD as PCP - General (Internal Medicine)    Referring Provider: Sathya Summers MD    Chief Complaint   Patient presents with    Follow-up        HISTORY OF PRESENT ILLNESS:  Ms. Woodward is a 63-year-old female well-known to this practice with well-established idiopathic autoimmune hepatitis which has been serologic remission for many years now on combination therapy azathioprine/Imuran 50 mg/day and budesonide 9 mg daily.  She was recently seen by Dr. Hyatt in follow-up for autoimmune hepatitis in January and has plans to follow-up yearly in regards to AIH but comes in today with new GI problems.    About 10 days ago, she had been very nauseous and vomiting for about a week and a half she saw her PCP, Dr. Summers and was given Z-Yunior for gastroenteritis.  She reports her symptoms worsened, she was having burning pain in her abdomen and continued to have nausea and vomiting and went to the emergency department at  with these complaints.  She had a CT abdomen pelvis which was completely benign.  She was treated in the emergency department with Zofran, GI cocktail, fluid bolus and Tylenol and felt much better.  Her symptoms particularly seem to improve following GI cocktail.  She was discharged with a prescription for famotidine and Carafate which she has been taking since 2/20/2024.  She is feeling much better.  She has not had any further vomiting, she had some nausea for a few days but this seems to have resolved at this point.  She does continue to have a little burning in her epigastric region.    PAST MEDICAL HISTORY  Past Medical History:   Diagnosis Date    Autoimmune hepatitis     History of COVID-19 01/05/2022    Hypertension         PAST SURGICAL HISTORY  Past Surgical History:   Procedure Laterality Date    CARPAL TUNNEL RELEASE      CHOLECYSTECTOMY      COLONOSCOPY       "ENDOSCOPY      HYSTERECTOMY          MEDICATIONS:    Current Outpatient Medications:     azaTHIOprine (IMURAN) 50 MG tablet, Take 1 tablet by mouth Daily., Disp: 30 tablet, Rfl: 11    Budesonide (ENTOCORT EC) 3 MG 24 hr capsule, TAKE THREE CAPSULES BY MOUTH EVERY MORNING, Disp: 90 capsule, Rfl: 11    Combigan 0.2-0.5 % ophthalmic solution, Administer 1 drop to both eyes As Needed., Disp: , Rfl:     latanoprost (XALATAN) 0.005 % ophthalmic solution, 1 drop Every Night., Disp: , Rfl:     losartan-hydrochlorothiazide (HYZAAR) 100-25 MG per tablet, Take 1 tablet by mouth Daily., Disp: , Rfl:     ondansetron (ZOFRAN) 4 MG tablet, Daily., Disp: , Rfl:     sucralfate (CARAFATE) 1 g tablet, Take 1 tablet by mouth 4 (Four) Times a Day., Disp: , Rfl:     TIMOLOL HEMIHYDRATE OP, Apply  to eye(s) as directed by provider., Disp: , Rfl:     omeprazole (priLOSEC) 40 MG capsule, Take 1 capsule by mouth Daily., Disp: 90 capsule, Rfl: 3    ALLERGIES  No Known Allergies    FAMILY HISTORY:  Family History   Problem Relation Age of Onset    Hypertension Mother     Hypertension Father     Hypertension Sister     Rheum arthritis Child     Colon cancer Neg Hx     Colon polyps Neg Hx        SOCIAL HISTORY  Social History     Socioeconomic History    Marital status:    Tobacco Use    Smoking status: Never    Smokeless tobacco: Never   Vaping Use    Vaping Use: Never used   Substance and Sexual Activity    Alcohol use: No    Drug use: No    Sexual activity: Defer         PHYSICAL EXAM   /94 (BP Location: Right arm, Patient Position: Sitting, Cuff Size: Adult)   Pulse 76   Temp 97.1 °F (36.2 °C) (Temporal)   Ht 172.7 cm (68\")   Wt 109 kg (239 lb 6.4 oz)   BMI 36.40 kg/m²   Physical Exam  Constitutional:       Appearance: Normal appearance.   HENT:      Head: Normocephalic and atraumatic.   Pulmonary:      Effort: Pulmonary effort is normal.   Abdominal:      General: There is no distension.      Palpations: Abdomen is soft. "      Tenderness:  in the epigastric area   Neurological:      Mental Status: She is alert and oriented to person, place, and time.   Psychiatric:         Mood and Affect: Mood normal.         Thought Content: Thought content normal.           Results Review:  CT Abdomen Pelvis With Contrast  Order: [id number]  Impression    No acute abdominal or pelvic findings.    CRITICAL RESULT:    No.    COMMUNICATION:  Per this written report.        Drafted by Clint Heath MD on 2/20/2024 8:38 PM  Final report signed by Clint Heath MD on 2/20/2024 8:42 PM  Narrative    CLINICAL INDICATION:  UTI, recurrent/complicated (Female)    TECHNIQUE:  Multiple axial CT images were obtained from lung bases through pubic symphysis following administration of IV contrast, Omnipaque 300, 100 mL. Reformatted images of the abdomen and pelvis in the coronal and sagittal planes were generated from the axial data set to facilitate diagnostic accuracy.    Total DLP (Dose-Length Product): 2267.80 mGy.cm. Please note: The reported value represents the total of one or more individual components during the CT acquisition on this date and at this time, and as such, the same value may appear in more than one CT report depending on the interpreting/reporting physicians.    COMPARISON:  None.    FINDINGS:    Lung bases are clear. Mitral valve annular calcifications    Prior cholecystectomy. No significant biliary dilatation. No focal liver lesion. The spleen, adrenal glands, pancreas, and kidneys have an unremarkable appearance. No renal or ureteral calculi. No hydronephrosis or hydroureter.    No intestinal obstruction or free air. Unremarkable appendix. No focal peritoneal inflammatory changes.    No pelvic mass or abnormal fluid collection. Prior hysterectomy. Small circular calcific density present in the right ovary nonspecific    No intra-abdominal or pelvic ascites.    No acute osseous abnormality  Exam End: 02/20/24 20:10       ASSESSMENT / PLAN  1.   GERD  - Discontinue famotidine  - May complete current prescription for Carafate but likely will not need a refill on this  - Begin omeprazole 40 mg daily    2.  Autoimmune hepatitis  - Continue azathioprine and budesonide  - Follow-up in January 2025 as previously planned       I discussed the patients findings and my recommendations with patient    YASEMIN Justiec

## 2024-03-11 RX ORDER — SODIUM, POTASSIUM,MAG SULFATES 17.5-3.13G
2 SOLUTION, RECONSTITUTED, ORAL ORAL TAKE AS DIRECTED
Qty: 354 ML | Refills: 0 | Status: SHIPPED | OUTPATIENT
Start: 2024-03-11

## 2024-03-25 ENCOUNTER — TELEPHONE (OUTPATIENT)
Dept: GASTROENTEROLOGY | Facility: CLINIC | Age: 64
End: 2024-03-25

## 2024-03-25 NOTE — TELEPHONE ENCOUNTER
Hub staff attempted to follow warm transfer process and was unsuccessful     Caller: RENAE IRVING    Relationship to patient: Other Relative    Best call back number: [phone]    Patient is needing: PATIENT'S SON (RENAE) NEEDS TO RESCHEDULE MOTHER'S CANCELLED APPOINTMENT FOR HER COLONOSCOPY. SHE WASN'T FEELING WELL AND DIDN'T DO THE PREP IN TIME.

## 2024-03-27 ENCOUNTER — TELEPHONE (OUTPATIENT)
Dept: GASTROENTEROLOGY | Facility: CLINIC | Age: 64
End: 2024-03-27
Payer: MEDICAID

## 2024-03-27 NOTE — TELEPHONE ENCOUNTER
Caller: RENAE    Relationship: son    Best call back number: [phone]    What is the best time to reach you:     Who are you requesting to speak with (clinical staff, provider,  specific staff member): scheduling staff    Do you know the name of the person who called:     What was the call regarding: upcoming colonoscopy    Is it okay if the provider responds through MyChart: no

## 2024-04-01 ENCOUNTER — OUTSIDE FACILITY SERVICE (OUTPATIENT)
Dept: GASTROENTEROLOGY | Facility: CLINIC | Age: 64
End: 2024-04-01

## 2024-04-02 DIAGNOSIS — K75.4 HEPATITIS, AUTOIMMUNE: ICD-10-CM

## 2024-04-02 RX ORDER — AZATHIOPRINE 50 MG/1
50 TABLET ORAL DAILY
Qty: 90 TABLET | Refills: 3 | Status: SHIPPED | OUTPATIENT
Start: 2024-04-02

## 2024-04-02 NOTE — TELEPHONE ENCOUNTER
Rx Refill Note  Requested Prescriptions     Pending Prescriptions Disp Refills    azaTHIOprine (IMURAN) 50 MG tablet [Pharmacy Med Name: azaTHIOprine 50 MG TABLET] 90 tablet      Sig: TAKE ONE TABLET BY MOUTH DAILY      Last office visit with prescribing clinician: 3/1/2024   Last telemedicine visit with prescribing clinician: Visit date not found   Next office visit with prescribing clinician: Visit date not found                         Would you like a call back once the refill request has been completed: [] Yes [] No    If the office needs to give you a call back, can they leave a voicemail: [] Yes [] No    Stella Maldonado LPN  04/02/24, 08:57 EDT

## 2025-01-07 ENCOUNTER — OFFICE VISIT (OUTPATIENT)
Dept: GASTROENTEROLOGY | Facility: CLINIC | Age: 65
End: 2025-01-07

## 2025-01-07 VITALS
DIASTOLIC BLOOD PRESSURE: 100 MMHG | BODY MASS INDEX: 39.99 KG/M2 | OXYGEN SATURATION: 96 % | WEIGHT: 263 LBS | HEART RATE: 84 BPM | SYSTOLIC BLOOD PRESSURE: 162 MMHG

## 2025-01-07 DIAGNOSIS — K21.9 GASTROESOPHAGEAL REFLUX DISEASE, UNSPECIFIED WHETHER ESOPHAGITIS PRESENT: ICD-10-CM

## 2025-01-07 DIAGNOSIS — K75.4 HEPATITIS, AUTOIMMUNE: Primary | ICD-10-CM

## 2025-01-07 RX ORDER — BIMATOPROST 0.1 MG/ML
SOLUTION/ DROPS OPHTHALMIC EVERY 12 HOURS SCHEDULED
COMMUNITY

## 2025-01-07 NOTE — PROGRESS NOTES
GASTROENTEROLOGY OFFICE NOTE  Niru Woodward  [id number]  1960  right viral load      Chief Complaint   Patient presents with    Autoimmune Hepatitis        HISTORY OF PRESENT ILLNESS:  Patient with idiopathic autoimmune hepatitis presents on azathioprine 50 mg/day budesonide 6 mg daily for which she remains on faithfully.  Recent serum liver enzymes were mildly elevated but less than 1-1/2 time the upper limits of normal.  Most recent serum liver enzymes are from December 23, 2024 which time ALT was 44 units/L (high normal 32) and AST was 52 units/L (high normal 41).  Alkaline phosphatase was under 35 units/L.  This compares to September 17, 2024 labs at which time ALT and AST were 32 and 41 units/L.  Prior to that in May 2024 ALT was 42 and AST 50.    She presents with nothing in the way of any localized GI complaints specifically denying fevers, chills, nausea, vomiting, melena, bright red blood per rectum, constipation or diarrhea.  She denies fevers or chills.    She is status post colonoscopy April 1, 2024 which time no adenomatous polyps were confirmed.  She has a prior history of adenomatous colon polyps and therefore a surveillance interval of 5 to 7 years was recommended (as early as April 2024)    PAST MEDICAL HISTORY  Past Medical History:    Autoimmune hepatitis    History of COVID-19    Hypertension        PAST SURGICAL HISTORY  Past Surgical History:    CARPAL TUNNEL RELEASE    CHOLECYSTECTOMY    COLONOSCOPY    ENDOSCOPY    HYSTERECTOMY        MEDICATIONS:    Current Outpatient Medications:     azaTHIOprine (IMURAN) 50 MG tablet, TAKE ONE TABLET BY MOUTH DAILY, Disp: 90 tablet, Rfl: 3    bimatoprost (Lumigan) 0.01 % ophthalmic drops, Every 12 (Twelve) Hours., Disp: , Rfl:     Budesonide (ENTOCORT EC) 3 MG 24 hr capsule, TAKE THREE CAPSULES BY MOUTH EVERY MORNING, Disp: 90 capsule, Rfl: 11    Combigan 0.2-0.5 % ophthalmic solution, Administer 1 drop to both eyes As Needed., Disp: , Rfl:      latanoprost (XALATAN) 0.005 % ophthalmic solution, 1 drop Every Night., Disp: , Rfl:     losartan-hydrochlorothiazide (HYZAAR) 100-25 MG per tablet, Take 1 tablet by mouth Daily., Disp: , Rfl:     omeprazole (priLOSEC) 40 MG capsule, Take 1 capsule by mouth Daily., Disp: 90 capsule, Rfl: 3    ondansetron (ZOFRAN) 4 MG tablet, Daily., Disp: , Rfl:     sodium-potassium-magnesium sulfates (Suprep Bowel Prep Kit) 17.5-3.13-1.6 GM/177ML solution oral solution, Take 2 bottles by mouth Take As Directed. Do not eat the day before your procedure. If you didn't receive instructions call [phone]., Disp: 354 mL, Rfl: 0    TIMOLOL HEMIHYDRATE OP, Apply  to eye(s) as directed by provider., Disp: , Rfl:     ALLERGIES  has No Known Allergies.    FAMILY HISTORY:  Cancer-related family history is negative for Colon cancer.  Colon Cancer-related family history is negative for Colon cancer and Colon polyps.    SOCIAL HISTORY  She  reports that she has never smoked. She has never used smokeless tobacco. She reports that she does not drink alcohol and does not use drugs.   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  .  5 children.  14 grandchildren non-smoker.  Nondrinker      PHYSICAL EXAM   /100 (BP Location: Left arm, Patient Position: Sitting, Cuff Size: Large Adult)   Pulse 84   Wt 119 kg (263 lb)   SpO2 96%   BMI 39.99 kg/m²   General: Pleasant, no apparent acute distress.  Alert and oriented.  HEENT: Anicteric sclera  Lungs: Grossly normal respiration without labored breathing or audible wheezing noted.  Speaking in full sentences  Abdomen: Without gross or obvious distention  Neurologic: Normal cognition and affect.  Alert and oriented    ASSESSMENT  1.-Idiopathic autoimmune hepatitis  2.-History of adenomatous polyps  3.-Mild elevation liver enzymes not requiring change in therapy but continued monitoring.  Consider increasing budesonide to 9 mg/day and checking thioguanine levels if  serum liver enzymes continue to rise  4.-Obesity.  Weight loss encouraged    PLAN  1.-Continue monitoring of serum liver enzymes  2.-Follow-up appointment with us in 1 year  3.-Increase budesonide to 9 mg/day and check thioguanine levels if serum liver enzymes continue to rise.  As long as they remain less than 1-1/2 times the upper limits of normal changes in therapy are not necessary  4.-Follow-up appointment in 1 year  5.-Surveillance colonoscopy due April 2029      Mikey Hyatt MD  1/7/2025   13:54 EST

## 2025-02-28 DIAGNOSIS — K75.4 HEPATITIS, AUTOIMMUNE: ICD-10-CM

## 2025-02-28 RX ORDER — BUDESONIDE 3 MG/1
9 CAPSULE, COATED PELLETS ORAL EVERY MORNING
Qty: 90 CAPSULE | Refills: 11 | Status: SHIPPED | OUTPATIENT
Start: 2025-02-28

## 2025-06-04 DIAGNOSIS — K75.4 HEPATITIS, AUTOIMMUNE: ICD-10-CM

## 2025-06-04 RX ORDER — AZATHIOPRINE 50 MG/1
50 TABLET ORAL DAILY
Qty: 90 TABLET | Refills: 3 | Status: SHIPPED | OUTPATIENT
Start: 2025-06-04